# Patient Record
Sex: FEMALE | Race: WHITE | NOT HISPANIC OR LATINO | Employment: FULL TIME | URBAN - METROPOLITAN AREA
[De-identification: names, ages, dates, MRNs, and addresses within clinical notes are randomized per-mention and may not be internally consistent; named-entity substitution may affect disease eponyms.]

---

## 2020-01-14 ENCOUNTER — HOSPITAL ENCOUNTER (EMERGENCY)
Facility: HOSPITAL | Age: 55
Discharge: HOME | End: 2020-01-14
Attending: EMERGENCY MEDICINE | Admitting: EMERGENCY MEDICINE

## 2020-01-14 ENCOUNTER — APPOINTMENT (OUTPATIENT)
Dept: GENERAL RADIOLOGY | Facility: HOSPITAL | Age: 55
End: 2020-01-14

## 2020-01-14 VITALS
WEIGHT: 189 LBS | HEART RATE: 69 BPM | TEMPERATURE: 97.1 F | DIASTOLIC BLOOD PRESSURE: 82 MMHG | HEIGHT: 61 IN | BODY MASS INDEX: 35.68 KG/M2 | RESPIRATION RATE: 16 BRPM | OXYGEN SATURATION: 98 % | SYSTOLIC BLOOD PRESSURE: 152 MMHG

## 2020-01-14 DIAGNOSIS — R07.9 CHEST PAIN, UNSPECIFIED TYPE: Primary | ICD-10-CM

## 2020-01-14 LAB
ALBUMIN SERPL-MCNC: 4.2 G/DL (ref 3.5–5.2)
ALBUMIN/GLOB SERPL: 1.2 G/DL
ALP SERPL-CCNC: 149 U/L (ref 39–117)
ALT SERPL W P-5'-P-CCNC: 54 U/L (ref 1–33)
ANION GAP SERPL CALCULATED.3IONS-SCNC: 12.1 MMOL/L (ref 5–15)
AST SERPL-CCNC: 88 U/L (ref 1–32)
BASOPHILS # BLD AUTO: 0.04 10*3/MM3 (ref 0–0.2)
BASOPHILS NFR BLD AUTO: 0.8 % (ref 0–1.5)
BILIRUB SERPL-MCNC: 0.4 MG/DL (ref 0.2–1.2)
BUN BLD-MCNC: 21 MG/DL (ref 6–20)
BUN/CREAT SERPL: 22.6 (ref 7–25)
CALCIUM SPEC-SCNC: 9.9 MG/DL (ref 8.6–10.5)
CHLORIDE SERPL-SCNC: 101 MMOL/L (ref 98–107)
CO2 SERPL-SCNC: 22.9 MMOL/L (ref 22–29)
CREAT BLD-MCNC: 0.93 MG/DL (ref 0.57–1)
DEPRECATED RDW RBC AUTO: 42.6 FL (ref 37–54)
EOSINOPHIL # BLD AUTO: 0.1 10*3/MM3 (ref 0–0.4)
EOSINOPHIL NFR BLD AUTO: 1.9 % (ref 0.3–6.2)
ERYTHROCYTE [DISTWIDTH] IN BLOOD BY AUTOMATED COUNT: 12.9 % (ref 12.3–15.4)
GFR SERPL CREATININE-BSD FRML MDRD: 63 ML/MIN/1.73
GLOBULIN UR ELPH-MCNC: 3.5 GM/DL
GLUCOSE BLD-MCNC: 66 MG/DL (ref 65–99)
GLUCOSE BLDC GLUCOMTR-MCNC: 133 MG/DL (ref 70–130)
HCT VFR BLD AUTO: 41.6 % (ref 34–46.6)
HGB BLD-MCNC: 13.6 G/DL (ref 12–15.9)
IMM GRANULOCYTES # BLD AUTO: 0.01 10*3/MM3 (ref 0–0.05)
IMM GRANULOCYTES NFR BLD AUTO: 0.2 % (ref 0–0.5)
LYMPHOCYTES # BLD AUTO: 1.84 10*3/MM3 (ref 0.7–3.1)
LYMPHOCYTES NFR BLD AUTO: 34.5 % (ref 19.6–45.3)
MCH RBC QN AUTO: 29.4 PG (ref 26.6–33)
MCHC RBC AUTO-ENTMCNC: 32.7 G/DL (ref 31.5–35.7)
MCV RBC AUTO: 90 FL (ref 79–97)
MONOCYTES # BLD AUTO: 0.25 10*3/MM3 (ref 0.1–0.9)
MONOCYTES NFR BLD AUTO: 4.7 % (ref 5–12)
NEUTROPHILS # BLD AUTO: 3.09 10*3/MM3 (ref 1.7–7)
NEUTROPHILS NFR BLD AUTO: 57.9 % (ref 42.7–76)
NRBC BLD AUTO-RTO: 0 /100 WBC (ref 0–0.2)
PLATELET # BLD AUTO: 245 10*3/MM3 (ref 140–450)
PMV BLD AUTO: 9.9 FL (ref 6–12)
POTASSIUM BLD-SCNC: 3.9 MMOL/L (ref 3.5–5.2)
PROT SERPL-MCNC: 7.7 G/DL (ref 6–8.5)
RBC # BLD AUTO: 4.62 10*6/MM3 (ref 3.77–5.28)
SODIUM BLD-SCNC: 136 MMOL/L (ref 136–145)
TROPONIN T SERPL-MCNC: <0.01 NG/ML (ref 0–0.03)
WBC NRBC COR # BLD: 5.33 10*3/MM3 (ref 3.4–10.8)

## 2020-01-14 PROCEDURE — 84484 ASSAY OF TROPONIN QUANT: CPT | Performed by: PHYSICIAN ASSISTANT

## 2020-01-14 PROCEDURE — 93010 ELECTROCARDIOGRAM REPORT: CPT | Performed by: INTERNAL MEDICINE

## 2020-01-14 PROCEDURE — 36415 COLL VENOUS BLD VENIPUNCTURE: CPT

## 2020-01-14 PROCEDURE — 71045 X-RAY EXAM CHEST 1 VIEW: CPT

## 2020-01-14 PROCEDURE — 85025 COMPLETE CBC W/AUTO DIFF WBC: CPT | Performed by: PHYSICIAN ASSISTANT

## 2020-01-14 PROCEDURE — 80053 COMPREHEN METABOLIC PANEL: CPT | Performed by: EMERGENCY MEDICINE

## 2020-01-14 PROCEDURE — 93005 ELECTROCARDIOGRAM TRACING: CPT | Performed by: EMERGENCY MEDICINE

## 2020-01-14 PROCEDURE — 82962 GLUCOSE BLOOD TEST: CPT

## 2020-01-14 PROCEDURE — 99284 EMERGENCY DEPT VISIT MOD MDM: CPT

## 2020-01-14 PROCEDURE — 93005 ELECTROCARDIOGRAM TRACING: CPT

## 2020-01-14 RX ORDER — HYDROCODONE BITARTRATE AND ACETAMINOPHEN 7.5; 325 MG/1; MG/1
1 TABLET ORAL ONCE
Status: COMPLETED | OUTPATIENT
Start: 2020-01-14 | End: 2020-01-14

## 2020-01-14 RX ORDER — SODIUM CHLORIDE 0.9 % (FLUSH) 0.9 %
10 SYRINGE (ML) INJECTION AS NEEDED
Status: DISCONTINUED | OUTPATIENT
Start: 2020-01-14 | End: 2020-01-14 | Stop reason: HOSPADM

## 2020-01-14 RX ORDER — ONDANSETRON 4 MG/1
4 TABLET, ORALLY DISINTEGRATING ORAL ONCE
Status: COMPLETED | OUTPATIENT
Start: 2020-01-14 | End: 2020-01-14

## 2020-01-14 RX ADMIN — ONDANSETRON 4 MG: 4 TABLET, ORALLY DISINTEGRATING ORAL at 13:08

## 2020-01-14 RX ADMIN — HYDROCODONE BITARTRATE AND ACETAMINOPHEN 1 TABLET: 7.5; 325 TABLET ORAL at 13:08

## 2020-01-14 NOTE — ED PROVIDER NOTES
EMERGENCY DEPARTMENT ENCOUNTER    CHIEF COMPLAINT  Chief Complaint: Chest Pain   History given by: patient   History limited by: none   Room Number: 31/31  PMD: Provider, No Known      HPI:  Pt is a 54 y.o. female who presents complaining of intermittent chest pain that began at 2000 last night at rest, with episodes lasting 20-30 minutes. Pt states pain radiates to neck and bilateral arms. Pt confirms nausea and SOA upon episodes. Per pt, she has hx of stent placed in September 2019, had post operative complications, MRSA to wound site, and states she had wound vac to catheter site. Pt states she was seen again for chest pain in 12/2019, but did not have repeat cardiac cath performed.    Duration:  1 day, episodes 20- 30 minutes  Onset: gradual   Timing: intermittent   Location: chest   Radiation: to neck and bilateral arms  Quality: pain   Intensity/Severity: moderate   Progression: unchanged   Associated Symptoms: nausea and SOA   Aggravating Factors: none   Alleviating Factors: none   Previous Episodes: Pt affirms hx of CAD with stent placement.   Treatment before arrival: none     PAST MEDICAL HISTORY  Active Ambulatory Problems     Diagnosis Date Noted   • No Active Ambulatory Problems     Resolved Ambulatory Problems     Diagnosis Date Noted   • No Resolved Ambulatory Problems     No Additional Past Medical History       PAST SURGICAL HISTORY  No past surgical history on file.    FAMILY HISTORY  No family history on file.    SOCIAL HISTORY  Social History     Socioeconomic History   • Marital status: Single     Spouse name: Not on file   • Number of children: Not on file   • Years of education: Not on file   • Highest education level: Not on file       ALLERGIES  Bee venom; Flagyl [metronidazole]; Meloxicam; Metformin; and Sulfa antibiotics    REVIEW OF SYSTEMS  Review of Systems   Constitutional: Negative for fever.   HENT: Negative for sore throat.    Eyes: Negative.    Respiratory: Positive for shortness  of breath. Negative for cough.    Cardiovascular: Positive for chest pain (to neck and bilateral arms ).   Gastrointestinal: Positive for nausea. Negative for abdominal pain, diarrhea and vomiting.   Genitourinary: Negative for dysuria.   Musculoskeletal: Negative for neck pain.   Skin: Negative for rash.   Neurological: Negative for weakness, numbness and headaches.   Psychiatric/Behavioral: Negative.    All other systems reviewed and are negative.      PHYSICAL EXAM  ED Triage Vitals   Temp Heart Rate Resp BP SpO2   01/14/20 0955 01/14/20 0955 01/14/20 0955 01/14/20 1002 01/14/20 0955   97.1 °F (36.2 °C) 90 16 153/97 97 %      Temp src Heart Rate Source Patient Position BP Location FiO2 (%)   01/14/20 0955 01/14/20 0955 01/14/20 1002 01/14/20 1002 --   Tympanic Monitor Lying Right arm        Physical Exam   Constitutional: She is oriented to person, place, and time. No distress.   HENT:   Head: Normocephalic and atraumatic.   Eyes: Pupils are equal, round, and reactive to light. EOM are normal.   Neck: Normal range of motion. Neck supple.   Cardiovascular: Normal rate, regular rhythm and normal heart sounds.   Pulmonary/Chest: Effort normal and breath sounds normal. No respiratory distress.   Abdominal: Soft. There is no tenderness. There is no rebound and no guarding.   Musculoskeletal: Normal range of motion. She exhibits no edema.   Neurological: She is alert and oriented to person, place, and time. She has normal sensation and normal strength.   Skin: Skin is warm and dry. No rash noted.   Psychiatric: Mood and affect normal.   Nursing note and vitals reviewed.      LAB RESULTS  Lab Results (last 24 hours)     Procedure Component Value Units Date/Time    CBC & Differential [[id number]] Collected:  01/14/20 1023    Specimen:  Blood Updated:  01/14/20 1135    Narrative:       The following orders were created for panel order CBC & Differential.  Procedure                               Abnormality         Status                      ---------                               -----------         ------                     CBC Auto Differential[[id number]]        Abnormal            Final result                 Please view results for these tests on the individual orders.    Troponin [[id number]]  (Normal) Collected:  01/14/20 1023    Specimen:  Blood Updated:  01/14/20 1219     Troponin T <0.010 ng/mL     Narrative:       Troponin T Reference Range:  <= 0.03 ng/mL-   Negative for AMI  >0.03 ng/mL-     Abnormal for myocardial necrosis.  Clinicians would have to utilize clinical acumen, EKG, Troponin and serial changes to determine if it is an Acute Myocardial Infarction or myocardial injury due to an underlying chronic condition.       Results may be falsely decreased if patient taking Biotin.      CBC Auto Differential [[id number]]  (Abnormal) Collected:  01/14/20 1023    Specimen:  Blood Updated:  01/14/20 1135     WBC 5.33 10*3/mm3      RBC 4.62 10*6/mm3      Hemoglobin 13.6 g/dL      Hematocrit 41.6 %      MCV 90.0 fL      MCH 29.4 pg      MCHC 32.7 g/dL      RDW 12.9 %      RDW-SD 42.6 fl      MPV 9.9 fL      Platelets 245 10*3/mm3      Neutrophil % 57.9 %      Lymphocyte % 34.5 %      Monocyte % 4.7 %      Eosinophil % 1.9 %      Basophil % 0.8 %      Immature Grans % 0.2 %      Neutrophils, Absolute 3.09 10*3/mm3      Lymphocytes, Absolute 1.84 10*3/mm3      Monocytes, Absolute 0.25 10*3/mm3      Eosinophils, Absolute 0.10 10*3/mm3      Basophils, Absolute 0.04 10*3/mm3      Immature Grans, Absolute 0.01 10*3/mm3      nRBC 0.0 /100 WBC     POC Glucose Once [[id number]]  (Abnormal) Collected:  01/14/20 1129    Specimen:  Blood Updated:  01/14/20 1143     Glucose 133 mg/dL     Comprehensive Metabolic Panel [[id number]]  (Abnormal) Collected:  01/14/20 1249    Specimen:  Blood Updated:  01/14/20 1329     Glucose 66 mg/dL      BUN 21 mg/dL      Creatinine 0.93 mg/dL      Sodium 136 mmol/L      Potassium 3.9 mmol/L      Chloride 101  mmol/L      CO2 22.9 mmol/L      Calcium 9.9 mg/dL      Total Protein 7.7 g/dL      Albumin 4.20 g/dL      ALT (SGPT) 54 U/L      AST (SGOT) 88 U/L      Comment: Specimen hemolyzed.  Results may be affected.        Alkaline Phosphatase 149 U/L      Total Bilirubin 0.4 mg/dL      eGFR Non African Amer 63 mL/min/1.73      Globulin 3.5 gm/dL      A/G Ratio 1.2 g/dL      BUN/Creatinine Ratio 22.6     Anion Gap 12.1 mmol/L     Narrative:       GFR Normal >60  Chronic Kidney Disease <60  Kidney Failure <15            I ordered the above labs and reviewed the results    RADIOLOGY  Xr Chest 1 View    Result Date: 1/14/2020  Narrative: XR CHEST 1 VW-  Clinical: Shortness of breath  FINDINGS: Lungs clear. No edema or effusion. Cardiomediastinal silhouette is satisfactory in appearance.  CONCLUSION: No active disease of the chest  This report was finalized on 1/14/2020 10:44 AM by Dr. David Lweis M.D.          I ordered the above noted radiological studies. Interpreted by radiologist. Reviewed by me in PACS.       PROCEDURES  Procedures  EKG          EKG time: 1005  Rhythm/Rate: NSR 75  P waves and HI: nml   QRS, axis: nml   ST and T waves: nml     Interpreted Contemporaneously by me, independently viewed  unchanged compared to prior      PROGRESS AND CONSULTS     1010: Labs and CXR ordered for further evaluation.     1038: Upon pt exam, discussed plan for workup in ED to determine further plan for care.     1238: Discussed pt's case with Dr. Wilson (ED Physician), who, after bedside evaluation, agrees with plan for care.    1308: RN approached and states patient is complaining of pain. Norco and Zofran ordered for pain management.    1350: Pt rechecked and resting comfortably, states pain is improving following pain medication. Discussed negative acute findings of workup with plan for discharge and outpatient Cardiology follow up. Reviewed with pt her recent negative cardiac workup at [facility] and plan to not repeat that  further at this time. Pt understands and agrees with the plan, all questions answered.    MEDICAL DECISION MAKING  Results were reviewed/discussed with the patient and they were also made aware of online access. Pt also made aware that some labs, such as cultures, will not be resulted during ER visit and follow up with PMD is necessary.     MDM  Number of Diagnoses or Management Options  Chest pain, unspecified type:      Amount and/or Complexity of Data Reviewed  Clinical lab tests: ordered and reviewed (Unremarkable labs)  Tests in the radiology section of CPT®: ordered and reviewed (CXR - negative acute )  Tests in the medicine section of CPT®: ordered and reviewed (See note)  Review and summarize past medical records: yes (9/22/19 - admitted to [facility] for NSTEMI - cardiac cath performed: successful direct stenting of the proximal portion of the first obtuse  marginal artery  11/2019- negative stress test.  12/2019 - pt admitted to [facility] for acute chest pain, had normal EKG, negative CTA, and negative troponin x3. Cardiology was consulted but full cardiac workup was not repeated due to negative full workup from several months prior )           DIAGNOSIS  Final diagnoses:   Chest pain, unspecified type       DISPOSITION  DISCHARGE    Patient discharged in stable condition.    Reviewed implications of results, diagnosis, meds, responsibility to follow up, warning signs and symptoms of possible worsening, potential complications and reasons to return to ER.    Patient/Family voiced understanding of above instructions.    Discussed plan for discharge, as there is no emergent indication for admission. Patient referred to primary care provider for BP management due to today's BP. Pt/family is agreeable and understands need for follow up and repeat testing.  Pt is aware that discharge does not mean that nothing is wrong but it indicates no emergency is present that requires admission and they must continue care  with follow-up as given below or physician of their choice.     FOLLOW-UP  Kristofer Carey MD  [address]  [place] [address]  [phone]    Schedule an appointment as soon as possible for a visit            Medication List      No changes were made to your prescriptions during this visit.           Latest Documented Vital Signs:  As of 2:01 PM  BP- 144/92 HR- 67 Temp- 97.1 °F (36.2 °C) (Tympanic) O2 sat- 97%    --  Documentation assistance provided by kelly Bruce for Aly Macdonald PA-C.  Information recorded by the scribe was done at my direction and has been verified and validated by me.              Alberta Bruce  01/14/20 1401       Aly Macdonald PA  01/14/20 8712

## 2020-01-14 NOTE — ED NOTES
Pt is requesting something for her chest pain and nausea. PA notified.        Jaki Page RN  01/14/20 1129

## 2020-01-14 NOTE — ED PROVIDER NOTES
Pt is a 54 y.o. female who presents to the ED complaining of intermittent CP that started at 2200 last night. Pt also c/o nausea. Pt denies fever, chills and cough. Pt has hx of stent placement in 9/2019 with post operative complications of MRSA to wound site.     On exam,  Constitutional: NAD  HENT: normocephalic  Cardiovascular: HRRR  Pulmonary: chest wall tenderness, no crepitus or subcutaneous air  Abdomen: soft, non-tender  Musculoskeletal: moves all extremities  Neurological: A&Ox3    Labs and imaging reviewed.     Plan: Discussed w/pt there is no change from prior EKG. Plan is to obtain labs and CXR. Pt understands and agrees with the plan. All questions were answered.    MDM  Number of Diagnoses or Management Options     Amount and/or Complexity of Data Reviewed  Decide to obtain previous medical records or to obtain history from someone other than the patient: yes  Review and summarize past medical records: (Pt was admitted on 12/28/19 for CP where she had a negative CTA Chest.   Pt was admitted in 11/2019 for CP where she had a Stress test without ischemia. )          MD ATTESTATION NOTE    The CHARLENE and I have discussed this patient's history, physical exam, and treatment plan.  I have reviewed the documentation and personally had a face to face interaction with the patient. I affirm the documentation and agree with the treatment and plan.  The attached note describes my personal findings.      Documentation assistance provided by kelly Rubio for Dr. Tunde Wilson. Information recorded by the scribe was done at my direction and has been verified and validated by me.             Ramiro Boateng  01/14/20 1212       Tunde Wilson MD  01/14/20 9534

## 2020-01-25 ENCOUNTER — HOSPITAL ENCOUNTER (EMERGENCY)
Facility: HOSPITAL | Age: 55
Discharge: ANOTHER HOSPITAL | End: 2020-01-26
Attending: EMERGENCY MEDICINE | Admitting: INTERNAL MEDICINE

## 2020-01-25 ENCOUNTER — APPOINTMENT (OUTPATIENT)
Dept: GENERAL RADIOLOGY | Facility: HOSPITAL | Age: 55
End: 2020-01-25

## 2020-01-25 VITALS
DIASTOLIC BLOOD PRESSURE: 87 MMHG | TEMPERATURE: 97.7 F | WEIGHT: 183 LBS | OXYGEN SATURATION: 99 % | BODY MASS INDEX: 34.55 KG/M2 | HEART RATE: 77 BPM | RESPIRATION RATE: 16 BRPM | SYSTOLIC BLOOD PRESSURE: 134 MMHG | HEIGHT: 61 IN

## 2020-01-25 DIAGNOSIS — I20.0 UNSTABLE ANGINA (HCC): Primary | ICD-10-CM

## 2020-01-25 LAB
ALBUMIN SERPL-MCNC: 4.2 G/DL (ref 3.5–5.2)
ALBUMIN/GLOB SERPL: 1.5 G/DL
ALP SERPL-CCNC: 144 U/L (ref 39–117)
ALT SERPL W P-5'-P-CCNC: 29 U/L (ref 1–33)
ANION GAP SERPL CALCULATED.3IONS-SCNC: 15.3 MMOL/L (ref 5–15)
AST SERPL-CCNC: 20 U/L (ref 1–32)
BASOPHILS # BLD AUTO: 0.03 10*3/MM3 (ref 0–0.2)
BASOPHILS NFR BLD AUTO: 0.5 % (ref 0–1.5)
BILIRUB SERPL-MCNC: 0.3 MG/DL (ref 0.2–1.2)
BUN BLD-MCNC: 20 MG/DL (ref 6–20)
BUN/CREAT SERPL: 19.2 (ref 7–25)
CALCIUM SPEC-SCNC: 9.2 MG/DL (ref 8.6–10.5)
CHLORIDE SERPL-SCNC: 95 MMOL/L (ref 98–107)
CO2 SERPL-SCNC: 23.7 MMOL/L (ref 22–29)
CREAT BLD-MCNC: 1.04 MG/DL (ref 0.57–1)
DEPRECATED RDW RBC AUTO: 42.7 FL (ref 37–54)
EOSINOPHIL # BLD AUTO: 0.06 10*3/MM3 (ref 0–0.4)
EOSINOPHIL NFR BLD AUTO: 1 % (ref 0.3–6.2)
ERYTHROCYTE [DISTWIDTH] IN BLOOD BY AUTOMATED COUNT: 13.1 % (ref 12.3–15.4)
GFR SERPL CREATININE-BSD FRML MDRD: 55 ML/MIN/1.73
GLOBULIN UR ELPH-MCNC: 2.8 GM/DL
GLUCOSE BLD-MCNC: 256 MG/DL (ref 65–99)
HCT VFR BLD AUTO: 38.4 % (ref 34–46.6)
HGB BLD-MCNC: 12.7 G/DL (ref 12–15.9)
IMM GRANULOCYTES # BLD AUTO: 0.02 10*3/MM3 (ref 0–0.05)
IMM GRANULOCYTES NFR BLD AUTO: 0.3 % (ref 0–0.5)
LYMPHOCYTES # BLD AUTO: 1.96 10*3/MM3 (ref 0.7–3.1)
LYMPHOCYTES NFR BLD AUTO: 33.4 % (ref 19.6–45.3)
MCH RBC QN AUTO: 29.5 PG (ref 26.6–33)
MCHC RBC AUTO-ENTMCNC: 33.1 G/DL (ref 31.5–35.7)
MCV RBC AUTO: 89.1 FL (ref 79–97)
MONOCYTES # BLD AUTO: 0.41 10*3/MM3 (ref 0.1–0.9)
MONOCYTES NFR BLD AUTO: 7 % (ref 5–12)
NEUTROPHILS # BLD AUTO: 3.39 10*3/MM3 (ref 1.7–7)
NEUTROPHILS NFR BLD AUTO: 57.8 % (ref 42.7–76)
NRBC BLD AUTO-RTO: 0 /100 WBC (ref 0–0.2)
PLATELET # BLD AUTO: 265 10*3/MM3 (ref 140–450)
PMV BLD AUTO: 9.5 FL (ref 6–12)
POTASSIUM BLD-SCNC: 3.9 MMOL/L (ref 3.5–5.2)
PROT SERPL-MCNC: 7 G/DL (ref 6–8.5)
RBC # BLD AUTO: 4.31 10*6/MM3 (ref 3.77–5.28)
SODIUM BLD-SCNC: 134 MMOL/L (ref 136–145)
TROPONIN T SERPL-MCNC: <0.01 NG/ML (ref 0–0.03)
TROPONIN T SERPL-MCNC: <0.01 NG/ML (ref 0–0.03)
WBC NRBC COR # BLD: 5.87 10*3/MM3 (ref 3.4–10.8)

## 2020-01-25 PROCEDURE — 93005 ELECTROCARDIOGRAM TRACING: CPT | Performed by: EMERGENCY MEDICINE

## 2020-01-25 PROCEDURE — G0378 HOSPITAL OBSERVATION PER HR: HCPCS

## 2020-01-25 PROCEDURE — 84484 ASSAY OF TROPONIN QUANT: CPT | Performed by: EMERGENCY MEDICINE

## 2020-01-25 PROCEDURE — 96372 THER/PROPH/DIAG INJ SC/IM: CPT

## 2020-01-25 PROCEDURE — 25010000002 ONDANSETRON PER 1 MG: Performed by: EMERGENCY MEDICINE

## 2020-01-25 PROCEDURE — 25010000002 ENOXAPARIN PER 10 MG: Performed by: EMERGENCY MEDICINE

## 2020-01-25 PROCEDURE — 99285 EMERGENCY DEPT VISIT HI MDM: CPT

## 2020-01-25 PROCEDURE — 80053 COMPREHEN METABOLIC PANEL: CPT | Performed by: EMERGENCY MEDICINE

## 2020-01-25 PROCEDURE — 96375 TX/PRO/DX INJ NEW DRUG ADDON: CPT

## 2020-01-25 PROCEDURE — 36415 COLL VENOUS BLD VENIPUNCTURE: CPT

## 2020-01-25 PROCEDURE — 71046 X-RAY EXAM CHEST 2 VIEWS: CPT

## 2020-01-25 PROCEDURE — 25010000002 MORPHINE PER 10 MG: Performed by: EMERGENCY MEDICINE

## 2020-01-25 PROCEDURE — 93005 ELECTROCARDIOGRAM TRACING: CPT

## 2020-01-25 PROCEDURE — 85025 COMPLETE CBC W/AUTO DIFF WBC: CPT | Performed by: EMERGENCY MEDICINE

## 2020-01-25 PROCEDURE — 93010 ELECTROCARDIOGRAM REPORT: CPT | Performed by: INTERNAL MEDICINE

## 2020-01-25 PROCEDURE — 96374 THER/PROPH/DIAG INJ IV PUSH: CPT

## 2020-01-25 RX ORDER — NITROGLYCERIN 0.4 MG/1
0.4 TABLET SUBLINGUAL
Status: COMPLETED | OUTPATIENT
Start: 2020-01-25 | End: 2020-01-25

## 2020-01-25 RX ORDER — TOPIRAMATE 50 MG/1
50 TABLET, FILM COATED ORAL
COMMUNITY
Start: 2019-10-30 | End: 2020-10-29

## 2020-01-25 RX ORDER — NITROGLYCERIN 0.4 MG/1
0.4 TABLET SUBLINGUAL
COMMUNITY
Start: 2019-09-24

## 2020-01-25 RX ORDER — MULTIVITAMIN
1 TABLET ORAL DAILY
COMMUNITY

## 2020-01-25 RX ORDER — BUDESONIDE 0.5 MG/2ML
0.5 INHALANT ORAL
COMMUNITY
Start: 2019-10-10

## 2020-01-25 RX ORDER — MEMANTINE HYDROCHLORIDE 10 MG/1
TABLET ORAL
COMMUNITY
Start: 2019-11-14

## 2020-01-25 RX ORDER — ASPIRIN 81 MG/1
81 TABLET, CHEWABLE ORAL DAILY
COMMUNITY
Start: 2019-10-11

## 2020-01-25 RX ORDER — ISOSORBIDE MONONITRATE 30 MG/1
30 TABLET, EXTENDED RELEASE ORAL DAILY
COMMUNITY
Start: 2019-12-31

## 2020-01-25 RX ORDER — FERROUS SULFATE 325(65) MG
325 TABLET ORAL DAILY
COMMUNITY
Start: 2019-07-09

## 2020-01-25 RX ORDER — MONTELUKAST SODIUM 4 MG/1
1 TABLET, CHEWABLE ORAL
COMMUNITY
Start: 2019-12-16

## 2020-01-25 RX ORDER — FLUDROCORTISONE ACETATE 0.1 MG/1
0.2 TABLET ORAL DAILY
COMMUNITY
Start: 2019-09-07

## 2020-01-25 RX ORDER — ROPINIROLE 1 MG/1
1 TABLET, FILM COATED ORAL DAILY
COMMUNITY

## 2020-01-25 RX ORDER — ALBUTEROL SULFATE 2.5 MG/3ML
2.5 SOLUTION RESPIRATORY (INHALATION)
COMMUNITY
Start: 2019-05-14

## 2020-01-25 RX ORDER — MORPHINE SULFATE 2 MG/ML
2 INJECTION, SOLUTION INTRAMUSCULAR; INTRAVENOUS ONCE
Status: COMPLETED | OUTPATIENT
Start: 2020-01-25 | End: 2020-01-25

## 2020-01-25 RX ORDER — POTASSIUM CHLORIDE 20 MEQ/1
40 TABLET, EXTENDED RELEASE ORAL DAILY
COMMUNITY
Start: 2019-10-16

## 2020-01-25 RX ORDER — SUMATRIPTAN 6 MG/.5ML
6 INJECTION, SOLUTION SUBCUTANEOUS ONCE
COMMUNITY

## 2020-01-25 RX ORDER — PANTOPRAZOLE SODIUM 40 MG/1
TABLET, DELAYED RELEASE ORAL
COMMUNITY
Start: 2020-01-16

## 2020-01-25 RX ORDER — LOSARTAN POTASSIUM 25 MG/1
25 TABLET ORAL DAILY
COMMUNITY
Start: 2019-11-14

## 2020-01-25 RX ORDER — ASCORBIC ACID 500 MG
500 TABLET ORAL DAILY
COMMUNITY
Start: 2019-10-11

## 2020-01-25 RX ORDER — GABAPENTIN 400 MG/1
CAPSULE ORAL
COMMUNITY
Start: 2019-11-14

## 2020-01-25 RX ORDER — ONDANSETRON 2 MG/ML
4 INJECTION INTRAMUSCULAR; INTRAVENOUS ONCE
Status: COMPLETED | OUTPATIENT
Start: 2020-01-25 | End: 2020-01-25

## 2020-01-25 RX ORDER — MULTIVITAMIN,THER AND MINERALS
1 TABLET ORAL DAILY
COMMUNITY
Start: 2019-10-11

## 2020-01-25 RX ORDER — ASPIRIN 81 MG/1
243 TABLET, CHEWABLE ORAL ONCE
Status: COMPLETED | OUTPATIENT
Start: 2020-01-25 | End: 2020-01-25

## 2020-01-25 RX ORDER — MIDODRINE HYDROCHLORIDE 2.5 MG/1
2.5 TABLET ORAL 3 TIMES DAILY
COMMUNITY
Start: 2019-10-30

## 2020-01-25 RX ORDER — SUMATRIPTAN 25 MG/1
25 TABLET, FILM COATED ORAL
COMMUNITY

## 2020-01-25 RX ORDER — PREGABALIN 75 MG/1
CAPSULE ORAL
COMMUNITY
Start: 2019-10-10

## 2020-01-25 RX ORDER — FOLIC ACID 1 MG/1
1 TABLET ORAL DAILY
COMMUNITY
Start: 2019-12-16

## 2020-01-25 RX ORDER — LEVOTHYROXINE SODIUM 0.05 MG/1
50 TABLET ORAL DAILY
COMMUNITY
Start: 2019-06-11

## 2020-01-25 RX ORDER — DULOXETIN HYDROCHLORIDE 60 MG/1
60 CAPSULE, DELAYED RELEASE ORAL DAILY
COMMUNITY
Start: 2019-05-14

## 2020-01-25 RX ORDER — DIPHENOXYLATE HYDROCHLORIDE AND ATROPINE SULFATE 2.5; .025 MG/1; MG/1
1-2 TABLET ORAL
COMMUNITY
Start: 2019-05-14

## 2020-01-25 RX ORDER — SODIUM CHLORIDE 0.9 % (FLUSH) 0.9 %
10 SYRINGE (ML) INJECTION AS NEEDED
Status: DISCONTINUED | OUTPATIENT
Start: 2020-01-25 | End: 2020-01-26 | Stop reason: HOSPADM

## 2020-01-25 RX ORDER — DICYCLOMINE HCL 20 MG
20 TABLET ORAL
COMMUNITY
Start: 2019-11-14

## 2020-01-25 RX ORDER — HYDROCODONE BITARTRATE AND ACETAMINOPHEN 10; 325 MG/1; MG/1
1 TABLET ORAL
COMMUNITY
Start: 2020-01-02

## 2020-01-25 RX ORDER — ATORVASTATIN CALCIUM 20 MG/1
40 TABLET, FILM COATED ORAL DAILY
COMMUNITY
Start: 2019-09-24 | End: 2020-09-23

## 2020-01-25 RX ADMIN — MORPHINE SULFATE 2 MG: 2 INJECTION, SOLUTION INTRAMUSCULAR; INTRAVENOUS at 19:58

## 2020-01-25 RX ADMIN — ENOXAPARIN SODIUM 80 MG: 80 INJECTION SUBCUTANEOUS at 21:16

## 2020-01-25 RX ADMIN — ONDANSETRON 4 MG: 2 INJECTION INTRAMUSCULAR; INTRAVENOUS at 19:58

## 2020-01-25 RX ADMIN — NITROGLYCERIN 0.4 MG: 0.4 TABLET SUBLINGUAL at 19:56

## 2020-01-25 RX ADMIN — ASPIRIN 243 MG: 81 TABLET, CHEWABLE ORAL at 21:18

## 2020-01-25 NOTE — ED PROVIDER NOTES
EMERGENCY DEPARTMENT ENCOUNTER    Room Number:  16/16  Date seen:  1/25/2020  Time seen: 6:03 PM  PCP: Roshan Francis MD  Historian: Pt      HPI:  Chief Complaint: chest pain  A complete HPI/ROS/PMH/PSH/SH/FH are unobtainable due to: none  Context: Karen Bradley is a 54 y.o. female with cardiac stints who presents to the ED c/o gradual onset, intermittent chest pain since September 2019 worsening over the last 4 days. She also c/o nausea and soa with chest pain. Pt has temporary relief of pain with nitroglycerin.  She has had to take her nitroglycerin much more frequently.  Pt is also on baby ASA, Brilinta and Imdur (x1 30mg a day). She reports recent BP measurements 180/90's.        PAST MEDICAL HISTORY  Active Ambulatory Problems     Diagnosis Date Noted   • No Active Ambulatory Problems     Resolved Ambulatory Problems     Diagnosis Date Noted   • No Resolved Ambulatory Problems     Past Medical History:   Diagnosis Date   • Anemia    • Arthritis    • Asthma    • COPD (chronic obstructive pulmonary disease) (CMS/[facility])    • Coronary artery disease    • Depression    • Diabetes mellitus (CMS/HCC)    • Disease of thyroid gland    • GERD (gastroesophageal reflux disease)    • History of transfusion    • Hyperlipidemia    • Hypertension    • Injury of back    • Kidney stone    • Migraine    • Myocardial infarction (CMS/HCC)    • Pneumonia          PAST SURGICAL HISTORY  Past Surgical History:   Procedure Laterality Date   • CHOLECYSTECTOMY     • CORONARY ANGIOPLASTY WITH STENT PLACEMENT     • OOPHORECTOMY Right    • VASCULAR SURGERY           FAMILY HISTORY  History reviewed. No pertinent family history.      SOCIAL HISTORY  Social History     Socioeconomic History   • Marital status: Single     Spouse name: Not on file   • Number of children: Not on file   • Years of education: Not on file   • Highest education level: Not on file   Tobacco Use   • Smoking status: Never Smoker   • Smokeless tobacco: Never Used    Substance and Sexual Activity   • Alcohol use: Yes     Comment: occasional glass of wine   • Drug use: Never   • Sexual activity: Defer         ALLERGIES  Bee venom; Flagyl [metronidazole]; Meloxicam; Metformin; and Sulfa antibiotics        REVIEW OF SYSTEMS  Review of Systems   Constitutional: Negative for diaphoresis and fever.   HENT: Negative for congestion.    Eyes: Negative for visual disturbance.   Respiratory: Positive for shortness of breath.    Cardiovascular: Positive for chest pain. Negative for palpitations.   Gastrointestinal: Positive for nausea. Negative for blood in stool and vomiting.   Endocrine: Negative for polyuria.   Genitourinary: Negative for flank pain.   Musculoskeletal: Negative for joint swelling.   Skin: Negative for wound.   Neurological: Negative for seizures.   Hematological: Negative for adenopathy.   Psychiatric/Behavioral: Negative for sleep disturbance.        PHYSICAL EXAM  ED Triage Vitals [01/25/20 1751]   Temp Heart Rate Resp BP SpO2   97.7 °F (36.5 °C) 88 16 -- 98 %      Temp src Heart Rate Source Patient Position BP Location FiO2 (%)   Tympanic Monitor -- -- --         GENERAL: awake and alert, no acute distress  HENT: nares patent  EYES: no scleral icterus  CV: regular rhythm, normal rate, no murmur  RESPIRATORY: normal effort, CTAB  ABDOMEN: soft, non tender  MUSCULOSKELETAL: no deformity, no BLE edema or calf tenderness  NEURO: alert, moves all extremities, follows commands  SKIN: warm, dry    Vital signs and nursing notes reviewed.          LAB RESULTS  Recent Results (from the past 24 hour(s))   Comprehensive Metabolic Panel    Collection Time: 01/25/20  6:33 PM   Result Value Ref Range    Glucose 256 (H) 65 - 99 mg/dL    BUN 20 6 - 20 mg/dL    Creatinine 1.04 (H) 0.57 - 1.00 mg/dL    Sodium 134 (L) 136 - 145 mmol/L    Potassium 3.9 3.5 - 5.2 mmol/L    Chloride 95 (L) 98 - 107 mmol/L    CO2 23.7 22.0 - 29.0 mmol/L    Calcium 9.2 8.6 - 10.5 mg/dL    Total Protein 7.0  6.0 - 8.5 g/dL    Albumin 4.20 3.50 - 5.20 g/dL    ALT (SGPT) 29 1 - 33 U/L    AST (SGOT) 20 1 - 32 U/L    Alkaline Phosphatase 144 (H) 39 - 117 U/L    Total Bilirubin 0.3 0.2 - 1.2 mg/dL    eGFR Non African Amer 55 (L) >60 mL/min/1.73    Globulin 2.8 gm/dL    A/G Ratio 1.5 g/dL    BUN/Creatinine Ratio 19.2 7.0 - 25.0    Anion Gap 15.3 (H) 5.0 - 15.0 mmol/L   Troponin    Collection Time: 01/25/20  6:33 PM   Result Value Ref Range    Troponin T <0.010 0.000 - 0.030 ng/mL   CBC Auto Differential    Collection Time: 01/25/20  6:33 PM   Result Value Ref Range    WBC 5.87 3.40 - 10.80 10*3/mm3    RBC 4.31 3.77 - 5.28 10*6/mm3    Hemoglobin 12.7 12.0 - 15.9 g/dL    Hematocrit 38.4 34.0 - 46.6 %    MCV 89.1 79.0 - 97.0 fL    MCH 29.5 26.6 - 33.0 pg    MCHC 33.1 31.5 - 35.7 g/dL    RDW 13.1 12.3 - 15.4 %    RDW-SD 42.7 37.0 - 54.0 fl    MPV 9.5 6.0 - 12.0 fL    Platelets 265 140 - 450 10*3/mm3    Neutrophil % 57.8 42.7 - 76.0 %    Lymphocyte % 33.4 19.6 - 45.3 %    Monocyte % 7.0 5.0 - 12.0 %    Eosinophil % 1.0 0.3 - 6.2 %    Basophil % 0.5 0.0 - 1.5 %    Immature Grans % 0.3 0.0 - 0.5 %    Neutrophils, Absolute 3.39 1.70 - 7.00 10*3/mm3    Lymphocytes, Absolute 1.96 0.70 - 3.10 10*3/mm3    Monocytes, Absolute 0.41 0.10 - 0.90 10*3/mm3    Eosinophils, Absolute 0.06 0.00 - 0.40 10*3/mm3    Basophils, Absolute 0.03 0.00 - 0.20 10*3/mm3    Immature Grans, Absolute 0.02 0.00 - 0.05 10*3/mm3    nRBC 0.0 0.0 - 0.2 /100 WBC   Troponin    Collection Time: 01/25/20  8:01 PM   Result Value Ref Range    Troponin T <0.010 0.000 - 0.030 ng/mL       Ordered the above labs and reviewed the results.        RADIOLOGY  Xr Chest 2 View    Result Date: 1/25/2020  PA AND LATERAL CHEST  HISTORY: Chest pain. Asthma. COPD. Hypertension.  COMPARISON: AP chest 01/14/2020.  FINDINGS: Cardiomediastinal silhouette is within normal limits. There is mild linear subsegmental atelectasis in the left lower lobe near the left lateral costophrenic angle.  Lungs are otherwise clear and there is no evidence for pulmonary edema or pleural effusion. Cardiac monitor and leads are noted.      Mild subsegmental atelectasis left base. Otherwise, negative exam.  This report was finalized on 1/25/2020 7:46 PM by Dr. Hernesto Duke M.D.        Ordered the above noted radiological studies. Reviewed by me in PACS.      PROCEDURES  Procedures        EKG:           EKG time: 1757  Rhythm/Rate: SR 75  P waves and MO: Nml  QRS, axis: Nml axis, low voltage   ST and T waves: No acute ischemic changes     Interpreted Contemporaneously by me, independently viewed  Similar compared to prior 1/14/20        MEDICATIONS GIVEN IN ER  Medications   sodium chloride 0.9 % flush 10 mL (has no administration in time range)   aspirin chewable tablet 243 mg (has no administration in time range)   enoxaparin (LOVENOX) syringe 80 mg (has no administration in time range)   nitroglycerin (NITROSTAT) SL tablet 0.4 mg (0.4 mg Sublingual Given 1/25/20 1956)   morphine injection 2 mg (2 mg Intravenous Given 1/25/20 1958)   ondansetron (ZOFRAN) injection 4 mg (4 mg Intravenous Given 1/25/20 1958)               MEDICAL DECISION MAKING and ED Course    ED Course as of Jan 25 2045   Sat Jan 25, 2020   1806 Records reviewed from [facility] 9/2019:    Conclusions:    Equivocal iFR measurement across the proximal second obtuse marginal artery.  Interval development of thrombus and possibly dissection at the area of the  original lesion.  Successful direct stenting of the proximal portion of the first obtuse  marginal artery.  Interventional Recommendation  Dual antiplatelet therapy for one year then aspirin indefinitely.  Cardiac risk factor modification.  Cardiac rehabilitation.    [JR]   1934 HEART score 5.     [JR]   2042 Dr. Mascorro, cardiologist at [facility], accepts transfer.     [JR]      ED Course User Index  [JR] Octavio Adair MD     1953: Phone call with Dr. Blackburn, cardiology,  who agrees to admit the patient for further evaluation of her chest pain.    2040: Patient informed that her insurance is nonparticipating here and therefore we can happily admit her here however she will incur additional out-of-pocket expenses.  Pt wishes to be transferred to [facility] cardiology unit due to insurance issues at [facility], and for continuity of care with her cardiologist.      MDM     54-year-old female with history of CAD status post PCI to  with ROSITA and 9/20/2019, history of stable angina, presents today with worsening angina with increased need for nitroglycerin which is now only partially alleviating her pain.  She also takes Imdur 30 mg daily.  She reports her blood pressure has been high at home however it is normal here and she still having discomfort.  Chest x-ray is negative acute today.  Initial cardiac troponin is negative.  EKG is without acute ischemic changes.  She was given a single nitroglycerin here, additional 243 mg of aspirin, and 1 mg/kg of Lovenox subcutaneously for unstable angina.  She will require admission for further evaluation.  Due to insurance being nonparticipant or here, patient elects to be transferred to [facility] where she has been accepted by the cardiology service.    DIAGNOSIS  Final diagnoses:   Unstable angina (CMS/[facility])         DISPOSITION  Pt transferred to [facility].      Latest Documented Vital Signs:  As of 8:45 PM  BP- 144/88 HR- 78 Temp- 97.7 °F (36.5 °C) (Tympanic) O2 sat- 96%        --  Documentation assistance provided by kelly Tamayo for Dr. ALEXA Adair MD.  Information recorded by the scribe was done at my direction and has been verified and validated by me.      Please note that portions of this were completed with a voice recognition program.          Lucrecia Tamayo  01/25/20 2045       Octavio Adair MD  01/25/20 2049

## 2020-01-25 NOTE — ED TRIAGE NOTES
"Intermittent cp x 3-4 days.  Has been taken nitro and it 'calms down\" but doesn't do away.  She has take 2 nitro today.  Is nauseated and has back pain  "

## 2020-01-26 NOTE — ED NOTES
Pt access called at [facility] for update on room assignment. No new information obtained. Still awaiting bed assignment.     Vale Roy RN  01/25/20 1171

## 2020-01-26 NOTE — ED NOTES
/  Nursing report ED to floor  Karen Bradley  54 y.o.  female    HPI (triage note):   Chief Complaint   Patient presents with   • Chest Pain       Admitting doctor:   Rashi Blackburn DO    Admitting diagnosis:   The encounter diagnosis was Unstable angina (CMS/HCC).    Code status:   Current Code Status     Date Active Code Status Order ID Comments User Context       Not on file          Allergies:   Bee venom; Flagyl [metronidazole]; Meloxicam; Metformin; and Sulfa antibiotics    Weight:       01/25/20 1834   Weight: 83 kg (183 lb)       Most recent vitals:   Vitals:    01/25/20 1827 01/25/20 1834 01/25/20 1956 01/25/20 2000   BP: 126/81  144/88 144/88   BP Location: Left arm   Right arm   Patient Position: Lying   Lying   Pulse:   73 78   Resp:    18   Temp:       TempSrc:       SpO2:    96%   Weight:  83 kg (183 lb)     Height:           Active LDAs/IV Access:   Lines, Drains & Airways    Active LDAs     Name:   Placement date:   Placement time:   Site:   Days:    Peripheral IV 01/25/20 1836 Left Antecubital   01/25/20 1836    Antecubital   less than 1                Labs (abnormal labs have a star):   Labs Reviewed   COMPREHENSIVE METABOLIC PANEL - Abnormal; Notable for the following components:       Result Value    Glucose 256 (*)     Creatinine 1.04 (*)     Sodium 134 (*)     Chloride 95 (*)     Alkaline Phosphatase 144 (*)     eGFR Non  Amer 55 (*)     Anion Gap 15.3 (*)     All other components within normal limits    Narrative:     GFR Normal >60  Chronic Kidney Disease <60  Kidney Failure <15     TROPONIN (IN-HOUSE) - Normal    Narrative:     Troponin T Reference Range:  <= 0.03 ng/mL-   Negative for AMI  >0.03 ng/mL-     Abnormal for myocardial necrosis.  Clinicians would have to utilize clinical acumen, EKG, Troponin and serial changes to determine if it is an Acute Myocardial Infarction or myocardial injury due to an underlying chronic condition.       Results may be falsely decreased  if patient taking Biotin.     CBC WITH AUTO DIFFERENTIAL - Normal   TROPONIN (IN-HOUSE)   CBC AND DIFFERENTIAL    Narrative:     The following orders were created for panel order CBC & Differential.  Procedure                               Abnormality         Status                     ---------                               -----------         ------                     CBC Auto Differential[[id number]]        Normal              Final result                 Please view results for these tests on the individual orders.       EKG:   ECG 12 Lead   Final Result   HEART RATE= 75  bpm   RR Interval= 800  ms   NH Interval= 161  ms   P Horizontal Axis= -62  deg   P Front Axis= 45  deg   QRSD Interval= 89  ms   QT Interval= 368  ms   QRS Axis= 23  deg   T Wave Axis= 86  deg   - ABNORMAL ECG -   Sinus rhythm   Low voltage, extremity leads   Nonspecific T abnormalities, lateral leads   When compared with ECG of 14-Jan-2020 10:05:19,   No change from prior tracing   Electronically Signed By: Binu BarnardMARISOL) ([facility]) 25-Jan-2020 19:43:44   Date and Time of Study: 2020-01-25 17:57:29          Meds given in ED:   Medications   sodium chloride 0.9 % flush 10 mL (has no administration in time range)   nitroglycerin (NITROSTAT) SL tablet 0.4 mg (0.4 mg Sublingual Given 1/25/20 1956)   morphine injection 2 mg (2 mg Intravenous Given 1/25/20 1958)   ondansetron (ZOFRAN) injection 4 mg (4 mg Intravenous Given 1/25/20 1958)       Imaging results:  Xr Chest 2 View    Result Date: 1/25/2020  Mild subsegmental atelectasis left base. Otherwise, negative exam.  This report was finalized on 1/25/2020 7:46 PM by Dr. Hernesto Duek M.D.        Ambulatory status:   - up ad dom    Social issues:   Social History     Socioeconomic History   • Marital status: Single     Spouse name: Not on file   • Number of children: Not on file   • Years of education: Not on file   • Highest education level: Not on file        Vale Roy, RN  01/25/20  2012     sudden onset

## 2020-01-26 NOTE — ED NOTES
Room assignment received from [facility]. Nurse is tied up with raphael pt and unable to take report at this time, but will call back when she is able.     Vale Roy, RN  01/25/20 9049

## 2020-01-27 ENCOUNTER — ON CAMPUS - OUTPATIENT (OUTPATIENT)
Dept: URBAN - METROPOLITAN AREA HOSPITAL 108 | Facility: HOSPITAL | Age: 55
End: 2020-01-27

## 2020-01-27 DIAGNOSIS — K21.0 GASTRO-ESOPHAGEAL REFLUX DISEASE WITH ESOPHAGITIS: ICD-10-CM

## 2020-01-27 DIAGNOSIS — R07.89 OTHER CHEST PAIN: ICD-10-CM

## 2020-01-27 DIAGNOSIS — Z21 ASYMPTOMATIC HUMAN IMMUNODEFICIENCY VIRUS [HIV] INFECTION ST: ICD-10-CM

## 2020-01-27 PROCEDURE — 99203 OFFICE O/P NEW LOW 30 MIN: CPT | Performed by: INTERNAL MEDICINE

## 2020-05-27 VITALS — WEIGHT: 174 LBS | HEIGHT: 61 IN

## 2020-05-28 ENCOUNTER — OFFICE (OUTPATIENT)
Dept: URBAN - METROPOLITAN AREA CLINIC 75 | Facility: CLINIC | Age: 55
End: 2020-05-28

## 2020-05-28 DIAGNOSIS — R10.11 RIGHT UPPER QUADRANT PAIN: ICD-10-CM

## 2020-05-28 DIAGNOSIS — R11.2 NAUSEA WITH VOMITING, UNSPECIFIED: ICD-10-CM

## 2020-05-28 DIAGNOSIS — R93.3 ABNORMAL FINDINGS ON DIAGNOSTIC IMAGING OF OTHER PARTS OF DI: ICD-10-CM

## 2020-05-28 PROCEDURE — 99214 OFFICE O/P EST MOD 30 MIN: CPT | Mod: 95 | Performed by: INTERNAL MEDICINE

## 2020-05-28 NOTE — SERVICENOTES
Unable to do telemedicine visit due to patient's phone not working. Audio only visit done instead. Therefore, unable to do physical exam. 2nd call lasted 6 minutes and 50 seconds.

## 2020-05-28 NOTE — SERVICEHPINOTES
Patient is a 53 yo F with h/o anemia, anxiety, asthma, COPD, depression, DM, fibromyalgia, GERD, HIV, HLD, HTN, IBS, migraine, lupus, GILLIAN, CAD here due to abdominal pain. She was seen at [facility] as an inpatient 1/2020 for chest pain. This has improved. She was seen by [facility] 6/19 as an inpatient due to right rib pain that was attributed to MSK cause.She has been having abdominal pain, nausea, vomiting. The pain is on her right side. She says this started on Mother's day. She has had two ER visits for this. It is always present. It is a stabbing pain that is intense. Eating makes it worse. She is taking carafate, protonix and Zofran which is not helping. She says she also tried reglan, which is not helping. She has been losing weight for the past 4 years. She says this is due to her lupus and HIV, but says nobody is working up her weight loss. She says she has lost 185lbs. She says her HIV is under good control.  Weight 1/2020 was 189lbs.LUISITO Gardiner had cardiac cath 9/2019, which was complicated by post op pseudoaneurysm that got infected with MRSA requiring vascular surgery. She had cardiac cath 3/2020 with MARIA L placement. Stress test done 4/2020 was low risk. She takes aspirin and brilinta. LUISITO Stevenson brother had cirrhosis 2/2 alcohol use.Per chart reviewBR8/13/2013 EGD/Colon - mild gastritis. poor prep. small int hemorrhoidsBRPathology - duod- focal active inflammation, chronic gastritis, HP neg. AC- focal active colitis. TV/DC- focal congestionBR4/19/2019 - GES - normal though emptying half life ULNBR5/2019 EGD- gastritisBRPath- focal chronic active gastritis with surface erosion, no IM or HPBR6/24/2019 - US ABD RUQ - s/p lorena, no other abn detectedBR6/24/19 MRCP- 1. No significant biliary duct dilation along for cholecystectomy. No filling defects in the biliary system. Normal pancreatic duct.BR2. Minor bilateral renal scarring.BR9/23/2019 cardiac cath with MARIA L placement BR3/18/2020 cardiac cath with MARIA L placementBR4/8/2020 stress test- low risk BR5/19/2020 - CT ABD PELVIS with - mild linear atelectasis. fatty liver. s/p lorena. CBD mildly prominent, but stable. normal pancreas, spleen. mild thickening/edema within antrum of stomach and first part of duodenum. mod stool BR5/19/2020 - ER visit - c/o RUQ pain, nausea. seen for gastritis, duodenitis, acute UTI. given Carafate, Zofran, macrobid BR5/20/2020 - OV - Katharina - c/o epigastric pain, nausea diarrhea. given Lomotil. added Elavil for sleep, neuropathy BR5/26/2020 - lab - WBC 6.42, hgb 14.6, plat 254, creat 1, TB 0.6, AST 33, ALT 39H, AP 133H, lipase 232 5/26/2020 - ER - seen for RUQ pain. seen in ER 5/19/20 for same symptoms and given Carafate with no improvement. given reglan, protonix, pepcid

## 2020-06-22 ENCOUNTER — ON CAMPUS - OUTPATIENT (OUTPATIENT)
Dept: URBAN - METROPOLITAN AREA HOSPITAL 108 | Facility: HOSPITAL | Age: 55
End: 2020-06-22

## 2020-06-22 DIAGNOSIS — R11.2 NAUSEA WITH VOMITING, UNSPECIFIED: ICD-10-CM

## 2020-06-22 DIAGNOSIS — R10.11 RIGHT UPPER QUADRANT PAIN: ICD-10-CM

## 2020-06-22 DIAGNOSIS — K25.9 GASTRIC ULCER, UNSPECIFIED AS ACUTE OR CHRONIC, WITHOUT HEMO: ICD-10-CM

## 2020-06-22 DIAGNOSIS — R93.3 ABNORMAL FINDINGS ON DIAGNOSTIC IMAGING OF OTHER PARTS OF DI: ICD-10-CM

## 2020-06-22 DIAGNOSIS — K29.70 GASTRITIS, UNSPECIFIED, WITHOUT BLEEDING: ICD-10-CM

## 2020-06-22 PROCEDURE — 43235 EGD DIAGNOSTIC BRUSH WASH: CPT | Performed by: INTERNAL MEDICINE

## 2021-03-07 VITALS
TEMPERATURE: 97.2 F | SYSTOLIC BLOOD PRESSURE: 174 MMHG | WEIGHT: 177 LBS | HEIGHT: 61 IN | DIASTOLIC BLOOD PRESSURE: 110 MMHG

## 2021-03-09 ENCOUNTER — OFFICE (OUTPATIENT)
Dept: URBAN - METROPOLITAN AREA CLINIC 75 | Facility: CLINIC | Age: 56
End: 2021-03-09

## 2021-03-09 DIAGNOSIS — R11.2 NAUSEA WITH VOMITING, UNSPECIFIED: ICD-10-CM

## 2021-03-09 DIAGNOSIS — Z12.11 ENCOUNTER FOR SCREENING FOR MALIGNANT NEOPLASM OF COLON: ICD-10-CM

## 2021-03-09 DIAGNOSIS — R74.8 ABNORMAL LEVELS OF OTHER SERUM ENZYMES: ICD-10-CM

## 2021-03-09 PROCEDURE — 99214 OFFICE O/P EST MOD 30 MIN: CPT | Performed by: INTERNAL MEDICINE

## 2021-04-26 ENCOUNTER — ON CAMPUS - OUTPATIENT (OUTPATIENT)
Dept: URBAN - METROPOLITAN AREA HOSPITAL 108 | Facility: HOSPITAL | Age: 56
End: 2021-04-26
Payer: MEDICARE

## 2021-04-26 DIAGNOSIS — K63.5 POLYP OF COLON: ICD-10-CM

## 2021-04-26 DIAGNOSIS — R63.4 ABNORMAL WEIGHT LOSS: ICD-10-CM

## 2021-04-26 DIAGNOSIS — K29.70 GASTRITIS, UNSPECIFIED, WITHOUT BLEEDING: ICD-10-CM

## 2021-04-26 DIAGNOSIS — R11.2 NAUSEA WITH VOMITING, UNSPECIFIED: ICD-10-CM

## 2021-04-26 PROCEDURE — 43239 EGD BIOPSY SINGLE/MULTIPLE: CPT | Performed by: INTERNAL MEDICINE

## 2021-04-26 PROCEDURE — 45380 COLONOSCOPY AND BIOPSY: CPT | Performed by: INTERNAL MEDICINE

## 2021-09-26 ENCOUNTER — INPATIENT HOSPITAL (OUTPATIENT)
Dept: URBAN - METROPOLITAN AREA HOSPITAL 107 | Facility: HOSPITAL | Age: 56
End: 2021-09-26
Payer: MEDICARE

## 2021-09-26 DIAGNOSIS — R10.11 RIGHT UPPER QUADRANT PAIN: ICD-10-CM

## 2021-09-26 DIAGNOSIS — S36.112A CONTUSION OF LIVER, INITIAL ENCOUNTER: ICD-10-CM

## 2021-09-26 DIAGNOSIS — K91.840 POSTPROCEDURAL HEMORRHAGE OF A DIGESTIVE SYSTEM ORGAN OR STR: ICD-10-CM

## 2021-09-26 DIAGNOSIS — R74.8 ABNORMAL LEVELS OF OTHER SERUM ENZYMES: ICD-10-CM

## 2021-09-26 PROCEDURE — 99222 1ST HOSP IP/OBS MODERATE 55: CPT | Performed by: INTERNAL MEDICINE

## 2021-09-27 ENCOUNTER — INPATIENT HOSPITAL (OUTPATIENT)
Dept: URBAN - METROPOLITAN AREA HOSPITAL 107 | Facility: HOSPITAL | Age: 56
End: 2021-09-27
Payer: MEDICARE

## 2021-09-27 DIAGNOSIS — R10.11 RIGHT UPPER QUADRANT PAIN: ICD-10-CM

## 2021-09-27 DIAGNOSIS — K76.89 OTHER SPECIFIED DISEASES OF LIVER: ICD-10-CM

## 2021-09-27 PROCEDURE — 99231 SBSQ HOSP IP/OBS SF/LOW 25: CPT | Performed by: PHYSICIAN ASSISTANT

## 2022-12-21 ENCOUNTER — HOSPITAL ENCOUNTER (EMERGENCY)
Facility: HOSPITAL | Age: 57
Discharge: HOME | End: 2022-12-21
Attending: EMERGENCY MEDICINE | Admitting: EMERGENCY MEDICINE

## 2022-12-21 VITALS
TEMPERATURE: 96.6 F | DIASTOLIC BLOOD PRESSURE: 86 MMHG | RESPIRATION RATE: 18 BRPM | OXYGEN SATURATION: 100 % | BODY MASS INDEX: 33.86 KG/M2 | SYSTOLIC BLOOD PRESSURE: 131 MMHG | HEIGHT: 62 IN | HEART RATE: 67 BPM | WEIGHT: 184 LBS

## 2022-12-21 DIAGNOSIS — R10.10 ACUTE UPPER ABDOMINAL PAIN: ICD-10-CM

## 2022-12-21 DIAGNOSIS — K31.84 GASTROPARESIS: ICD-10-CM

## 2022-12-21 DIAGNOSIS — R42 ORTHOSTATIC DIZZINESS: ICD-10-CM

## 2022-12-21 DIAGNOSIS — R11.2 NAUSEA VOMITING AND DIARRHEA: Primary | ICD-10-CM

## 2022-12-21 DIAGNOSIS — R19.7 NAUSEA VOMITING AND DIARRHEA: Primary | ICD-10-CM

## 2022-12-21 LAB
ALBUMIN SERPL-MCNC: 4.3 G/DL (ref 3.5–5.2)
ALBUMIN/GLOB SERPL: 1.4 G/DL
ALP SERPL-CCNC: 155 U/L (ref 39–117)
ALT SERPL W P-5'-P-CCNC: 11 U/L (ref 1–33)
ANION GAP SERPL CALCULATED.3IONS-SCNC: 7.9 MMOL/L (ref 5–15)
AST SERPL-CCNC: 13 U/L (ref 1–32)
BASOPHILS # BLD AUTO: 0.03 10*3/MM3 (ref 0–0.2)
BASOPHILS NFR BLD AUTO: 0.6 % (ref 0–1.5)
BILIRUB SERPL-MCNC: 0.4 MG/DL (ref 0–1.2)
BUN SERPL-MCNC: 27 MG/DL (ref 6–20)
BUN/CREAT SERPL: 23.7 (ref 7–25)
CALCIUM SPEC-SCNC: 9.3 MG/DL (ref 8.6–10.5)
CHLORIDE SERPL-SCNC: 99 MMOL/L (ref 98–107)
CO2 SERPL-SCNC: 26.1 MMOL/L (ref 22–29)
CREAT SERPL-MCNC: 1.14 MG/DL (ref 0.57–1)
DEPRECATED RDW RBC AUTO: 40.7 FL (ref 37–54)
EGFRCR SERPLBLD CKD-EPI 2021: 55.9 ML/MIN/1.73
EOSINOPHIL # BLD AUTO: 0.09 10*3/MM3 (ref 0–0.4)
EOSINOPHIL NFR BLD AUTO: 1.9 % (ref 0.3–6.2)
ERYTHROCYTE [DISTWIDTH] IN BLOOD BY AUTOMATED COUNT: 12.6 % (ref 12.3–15.4)
GLOBULIN UR ELPH-MCNC: 3 GM/DL
GLUCOSE SERPL-MCNC: 331 MG/DL (ref 65–99)
HCT VFR BLD AUTO: 36.4 % (ref 34–46.6)
HGB BLD-MCNC: 12.3 G/DL (ref 12–15.9)
IMM GRANULOCYTES # BLD AUTO: 0.01 10*3/MM3 (ref 0–0.05)
IMM GRANULOCYTES NFR BLD AUTO: 0.2 % (ref 0–0.5)
LIPASE SERPL-CCNC: 71 U/L (ref 13–60)
LYMPHOCYTES # BLD AUTO: 1.37 10*3/MM3 (ref 0.7–3.1)
LYMPHOCYTES NFR BLD AUTO: 28.2 % (ref 19.6–45.3)
MCH RBC QN AUTO: 29.6 PG (ref 26.6–33)
MCHC RBC AUTO-ENTMCNC: 33.8 G/DL (ref 31.5–35.7)
MCV RBC AUTO: 87.5 FL (ref 79–97)
MONOCYTES # BLD AUTO: 0.25 10*3/MM3 (ref 0.1–0.9)
MONOCYTES NFR BLD AUTO: 5.2 % (ref 5–12)
NEUTROPHILS NFR BLD AUTO: 3.1 10*3/MM3 (ref 1.7–7)
NEUTROPHILS NFR BLD AUTO: 63.9 % (ref 42.7–76)
NRBC BLD AUTO-RTO: 0 /100 WBC (ref 0–0.2)
PLATELET # BLD AUTO: 219 10*3/MM3 (ref 140–450)
PMV BLD AUTO: 9.3 FL (ref 6–12)
POTASSIUM SERPL-SCNC: 4.6 MMOL/L (ref 3.5–5.2)
PROT SERPL-MCNC: 7.3 G/DL (ref 6–8.5)
QT INTERVAL: 388 MS
RBC # BLD AUTO: 4.16 10*6/MM3 (ref 3.77–5.28)
SODIUM SERPL-SCNC: 133 MMOL/L (ref 136–145)
TROPONIN T SERPL-MCNC: <0.01 NG/ML (ref 0–0.03)
WBC NRBC COR # BLD: 4.85 10*3/MM3 (ref 3.4–10.8)

## 2022-12-21 PROCEDURE — 83690 ASSAY OF LIPASE: CPT | Performed by: EMERGENCY MEDICINE

## 2022-12-21 PROCEDURE — 85025 COMPLETE CBC W/AUTO DIFF WBC: CPT | Performed by: EMERGENCY MEDICINE

## 2022-12-21 PROCEDURE — 80053 COMPREHEN METABOLIC PANEL: CPT | Performed by: EMERGENCY MEDICINE

## 2022-12-21 PROCEDURE — 84484 ASSAY OF TROPONIN QUANT: CPT | Performed by: EMERGENCY MEDICINE

## 2022-12-21 PROCEDURE — 25010000002 DIPHENHYDRAMINE PER 50 MG: Performed by: EMERGENCY MEDICINE

## 2022-12-21 PROCEDURE — 93010 ELECTROCARDIOGRAM REPORT: CPT | Performed by: INTERNAL MEDICINE

## 2022-12-21 PROCEDURE — 99284 EMERGENCY DEPT VISIT MOD MDM: CPT

## 2022-12-21 PROCEDURE — 93005 ELECTROCARDIOGRAM TRACING: CPT

## 2022-12-21 PROCEDURE — 25010000002 PROCHLORPERAZINE 10 MG/2ML SOLUTION: Performed by: EMERGENCY MEDICINE

## 2022-12-21 PROCEDURE — 96374 THER/PROPH/DIAG INJ IV PUSH: CPT

## 2022-12-21 PROCEDURE — 25010000002 ONDANSETRON PER 1 MG: Performed by: EMERGENCY MEDICINE

## 2022-12-21 PROCEDURE — 96375 TX/PRO/DX INJ NEW DRUG ADDON: CPT

## 2022-12-21 PROCEDURE — 93005 ELECTROCARDIOGRAM TRACING: CPT | Performed by: EMERGENCY MEDICINE

## 2022-12-21 RX ORDER — DIPHENHYDRAMINE HYDROCHLORIDE 50 MG/ML
12.5 INJECTION INTRAMUSCULAR; INTRAVENOUS ONCE
Status: COMPLETED | OUTPATIENT
Start: 2022-12-21 | End: 2022-12-21

## 2022-12-21 RX ORDER — SODIUM CHLORIDE 0.9 % (FLUSH) 0.9 %
10 SYRINGE (ML) INJECTION AS NEEDED
Status: DISCONTINUED | OUTPATIENT
Start: 2022-12-21 | End: 2022-12-21 | Stop reason: HOSPADM

## 2022-12-21 RX ORDER — ONDANSETRON 2 MG/ML
4 INJECTION INTRAMUSCULAR; INTRAVENOUS ONCE
Status: COMPLETED | OUTPATIENT
Start: 2022-12-21 | End: 2022-12-21

## 2022-12-21 RX ORDER — PROCHLORPERAZINE EDISYLATE 5 MG/ML
10 INJECTION INTRAMUSCULAR; INTRAVENOUS ONCE
Status: COMPLETED | OUTPATIENT
Start: 2022-12-21 | End: 2022-12-21

## 2022-12-21 RX ADMIN — ONDANSETRON 4 MG: 2 INJECTION INTRAMUSCULAR; INTRAVENOUS at 11:54

## 2022-12-21 RX ADMIN — SODIUM CHLORIDE, POTASSIUM CHLORIDE, SODIUM LACTATE AND CALCIUM CHLORIDE 1000 ML: 600; 310; 30; 20 INJECTION, SOLUTION INTRAVENOUS at 11:51

## 2022-12-21 RX ADMIN — DIPHENHYDRAMINE HYDROCHLORIDE 12.5 MG: 50 INJECTION, SOLUTION INTRAMUSCULAR; INTRAVENOUS at 15:29

## 2022-12-21 RX ADMIN — SODIUM CHLORIDE, POTASSIUM CHLORIDE, SODIUM LACTATE AND CALCIUM CHLORIDE 1000 ML: 600; 310; 30; 20 INJECTION, SOLUTION INTRAVENOUS at 14:01

## 2022-12-21 RX ADMIN — PROCHLORPERAZINE EDISYLATE 10 MG: 5 INJECTION INTRAMUSCULAR; INTRAVENOUS at 15:29

## 2022-12-21 NOTE — ED TRIAGE NOTES
Pt to ED from home via PV. Pt c/o nausea vomiting x1 day. Pt also c/o dizziness, worse with movement. Pt hx orthostatic hypotension and vertigo. Pt had fall this morning. Denies hitting head. Pt denies LOC. Pt on blood thinners.

## 2022-12-21 NOTE — ED NOTES
Pt c/o N/V/D, dizziness, and abdominal pain. Pt states the dizziness started yesterday. Pt states the N/V/D started the day before yesterday. Pt reports falling this morning due to dizziness. Pt denies hitting head or LOC. Pt takes plavix. Hx of gastroparesis, CABG, orthostatic hypotension, and vertigo.     This RN wore mask, gloves, eyewear and all other appropriate PPE while performing patient care.

## 2022-12-21 NOTE — DISCHARGE INSTRUCTIONS
Drink plenty of fluids.  Continue taking your home medications.  You may take Imodium as needed for diarrhea.  Return to the emergency department for worsening or persistent symptoms, fever, fainting, or other concern.  Follow-up with your primary care doctor and your gastroenterologist as soon as possible..

## 2022-12-21 NOTE — ED PROVIDER NOTES
EMERGENCY DEPARTMENT ENCOUNTER    Room Number:  14/14  Date of encounter:  12/22/2022  PCP: Roshan Francis MD  Historian: Patient     I used full protective equipment while examining this patient.  This includes face mask, gloves and protective eyewear.  I washed my hands before entering the room and immediately upon leaving the room.  Patient was wearing a surgical mask.      HPI:  Chief Complaint: Nausea, vomiting, diarrhea  A complete HPI/ROS/PMH/PSH/SH/FH are unobtainable due to: None    Context: Karen Bradley is a 57 y.o. female who presents to the ED by private vehicle from home c/o nausea, vomiting, and diarrhea.  Patient has had multiple episodes of nonbilious and nonbloody vomiting for the past 2 days.  She initially had several episodes of diarrhea but has not had any diarrhea today.  She also complains of right upper quadrant pain.  Pain is described as aching and burning.  Pain radiates into the lower chest.  Patient has a history of gastroparesis and orthostatic hypotension.  She complains of feeling dizzy when she stands up since yesterday.  She fell once this morning but denies any injury.  She did not hit her head.  She takes Plavix.  Denies fever, chills, shortness of breath, syncope, palpitations, headache, dysuria, or numbness/tingling/weakness in her extremities.  She has had similar symptoms multiple times in the past.  She was admitted to [facility] last month.  She has had a cholecystectomy and partial hysterectomy.      PAST MEDICAL HISTORY  Active Ambulatory Problems     Diagnosis Date Noted   • Unstable angina (HCC) 01/25/2020     Resolved Ambulatory Problems     Diagnosis Date Noted   • No Resolved Ambulatory Problems     Past Medical History:   Diagnosis Date   • Anemia    • Arthritis    • Asthma    • COPD (chronic obstructive pulmonary disease) (HCC)    • Coronary artery disease    • Depression    • Diabetes mellitus (HCC)    • Disease of thyroid gland    • GERD  (gastroesophageal reflux disease)    • History of transfusion    • Hyperlipidemia    • Hypertension    • Injury of back    • Kidney stone    • Migraine    • Myocardial infarction (HCC)    • Pneumonia          PAST SURGICAL HISTORY  Past Surgical History:   Procedure Laterality Date   • CHOLECYSTECTOMY     • CORONARY ANGIOPLASTY WITH STENT PLACEMENT     • OOPHORECTOMY Right    • VASCULAR SURGERY           FAMILY HISTORY  History reviewed. No pertinent family history.      SOCIAL HISTORY  Social History     Socioeconomic History   • Marital status: Single   Tobacco Use   • Smoking status: Never   • Smokeless tobacco: Never   Substance and Sexual Activity   • Alcohol use: Yes     Comment: occasional glass of wine   • Drug use: Never   • Sexual activity: Defer         ALLERGIES  Bee venom, Flagyl [metronidazole], Meloxicam, Metformin, and Sulfa antibiotics       REVIEW OF SYSTEMS  Review of Systems      All systems have been reviewed and are negative except as as discussed in the HPI    PHYSICAL EXAM    I have reviewed the triage vital signs and nursing notes.    ED Triage Vitals   Temp Heart Rate Resp BP SpO2   12/21/22 1051 12/21/22 1051 12/21/22 1117 12/21/22 1104 12/21/22 1051   96.6 °F (35.9 °C) 97 18 (!) 178/107 97 %      Temp src Heart Rate Source Patient Position BP Location FiO2 (%)   12/21/22 1051 -- -- -- --   Tympanic           Physical Exam  GENERAL: Awake, alert, oriented x3.  Well-developed, well-nourished and nontoxic-appearing female.  Resting comfortably in no acute distress  HENT: NCAT, nares patent, moist mucous membranes  EYES: Extraocular muscles intact, no nystagmus, PERRL  CV: regular rhythm, regular rate  RESPIRATORY: normal effort, clear to auscultation bilaterally  ABDOMEN: soft, obese, right upper quadrant tenderness without rebound or guarding, no CVA tenderness  MUSCULOSKELETAL: Extremities are nontender and without obvious deformity.  There is no calf tenderness or pedal edema  NEURO:  Speech is clear and fluent.  No aphasia.  No facial droop.  Follows commands.  Normal strength and light touch sensation all extremities.  Normal finger-nose and heel-to-shin testing bilaterally.  SKIN: warm, dry, no rash  PSYCH: Normal mood and affect      LAB RESULTS  Recent Results (from the past 24 hour(s))   ECG 12 Lead Chest Pain    Collection Time: 12/21/22 11:01 AM   Result Value Ref Range    QT Interval 388 ms   Comprehensive Metabolic Panel    Collection Time: 12/21/22 11:50 AM    Specimen: Blood   Result Value Ref Range    Glucose 331 (H) 65 - 99 mg/dL    BUN 27 (H) 6 - 20 mg/dL    Creatinine 1.14 (H) 0.57 - 1.00 mg/dL    Sodium 133 (L) 136 - 145 mmol/L    Potassium 4.6 3.5 - 5.2 mmol/L    Chloride 99 98 - 107 mmol/L    CO2 26.1 22.0 - 29.0 mmol/L    Calcium 9.3 8.6 - 10.5 mg/dL    Total Protein 7.3 6.0 - 8.5 g/dL    Albumin 4.30 3.50 - 5.20 g/dL    ALT (SGPT) 11 1 - 33 U/L    AST (SGOT) 13 1 - 32 U/L    Alkaline Phosphatase 155 (H) 39 - 117 U/L    Total Bilirubin 0.4 0.0 - 1.2 mg/dL    Globulin 3.0 gm/dL    A/G Ratio 1.4 g/dL    BUN/Creatinine Ratio 23.7 7.0 - 25.0    Anion Gap 7.9 5.0 - 15.0 mmol/L    eGFR 55.9 (L) >60.0 mL/min/1.73   Lipase    Collection Time: 12/21/22 11:50 AM    Specimen: Blood   Result Value Ref Range    Lipase 71 (H) 13 - 60 U/L   Troponin    Collection Time: 12/21/22 11:50 AM    Specimen: Blood   Result Value Ref Range    Troponin T <0.010 0.000 - 0.030 ng/mL   CBC Auto Differential    Collection Time: 12/21/22 11:50 AM    Specimen: Blood   Result Value Ref Range    WBC 4.85 3.40 - 10.80 10*3/mm3    RBC 4.16 3.77 - 5.28 10*6/mm3    Hemoglobin 12.3 12.0 - 15.9 g/dL    Hematocrit 36.4 34.0 - 46.6 %    MCV 87.5 79.0 - 97.0 fL    MCH 29.6 26.6 - 33.0 pg    MCHC 33.8 31.5 - 35.7 g/dL    RDW 12.6 12.3 - 15.4 %    RDW-SD 40.7 37.0 - 54.0 fl    MPV 9.3 6.0 - 12.0 fL    Platelets 219 140 - 450 10*3/mm3    Neutrophil % 63.9 42.7 - 76.0 %    Lymphocyte % 28.2 19.6 - 45.3 %    Monocyte % 5.2  5.0 - 12.0 %    Eosinophil % 1.9 0.3 - 6.2 %    Basophil % 0.6 0.0 - 1.5 %    Immature Grans % 0.2 0.0 - 0.5 %    Neutrophils, Absolute 3.10 1.70 - 7.00 10*3/mm3    Lymphocytes, Absolute 1.37 0.70 - 3.10 10*3/mm3    Monocytes, Absolute 0.25 0.10 - 0.90 10*3/mm3    Eosinophils, Absolute 0.09 0.00 - 0.40 10*3/mm3    Basophils, Absolute 0.03 0.00 - 0.20 10*3/mm3    Immature Grans, Absolute 0.01 0.00 - 0.05 10*3/mm3    nRBC 0.0 0.0 - 0.2 /100 WBC       Ordered the above labs and independently reviewed the results.      RADIOLOGY  No Radiology Exams Resulted Within Past 24 Hours    I ordered the above noted radiological studies. Reviewed by me and discussed with radiologist.  See dictation for official radiology interpretation.      PROCEDURES  Procedures      MEDICATIONS GIVEN IN ER    Medications   lactated ringers bolus 1,000 mL (0 mL Intravenous Stopped 12/21/22 1402)   ondansetron (ZOFRAN) injection 4 mg (4 mg Intravenous Given 12/21/22 1154)   lactated ringers bolus 1,000 mL (0 mL Intravenous Stopped 12/21/22 1730)   prochlorperazine (COMPAZINE) injection 10 mg (10 mg Intravenous Given 12/21/22 1529)   diphenhydrAMINE (BENADRYL) injection 12.5 mg (12.5 mg Intravenous Given 12/21/22 1529)         PROGRESS, DATA ANALYSIS, CONSULTS, AND MEDICAL DECISION MAKING    All labs have been independently reviewed by me.  All radiology studies have been reviewed by me and discussed with radiologist dictating the report.   EKG's independently viewed and interpreted by me.  I have reviewed the nurse's notes, vital signs, past medical history, and medication list.  Discussion below represents my analysis of pertinent findings related to patient's condition, differential diagnosis, treatment plan and final disposition.      ED Course as of 12/22/22 0807   Wed Dec 21, 2022   1133 Old records reviewed.  Patient does not have any prior admissions here.  She was admitted to [facility] in August 2022 for orthostatic syncope, acute  on chronic kidney failure, and exacerbation of gastroparesis.  Patient saw Dr. Francis, her PCP on 12/9/2022 for hospital follow-up.  She was admitted to [facility] 11/6 through 11/15/2022. [WH]   1143 Patient presents to the ED complaining of nausea, vomiting, diarrhea, right upper quadrant pain, and dizziness.  She has a history of gastroparesis and orthostatic hypotension.  She takes midodrine.  On exam, there is mild right upper quadrant tenderness but she does not have an acute abdomen.  Neuro exam is nonfocal.  Will obtain labs and EKG for further evaluation.  Will check orthostatic vitals.  Patient will be given IV fluids and IV Zofran.    Differential diagnosis includes but is not limited to: Gastroparesis exacerbation, gastroenteritis/gastritis, peptic ulcer disease, pancreatitis, colitis, diverticulitis, acute coronary syndrome, vertigo, orthostatic hypotension [WH]   1144 EKG          EKG time: 11:01 AM  Rhythm/Rate: Sinus rhythm, rate 85  P waves and OK: LAE  QRS, axis: Normal axis, anteroseptal Q waves  ST and T waves: Nonspecific ST/T wave changes in the lateral leads    Interpreted Contemporaneously by me at 11:10 AM, independently viewed  EKG is not significantly changed compared to prior EKG done on 1/25/2020   [WH]   1210 Orthostatic vitals reviewed.  Blood pressure decreased from 155/94 (lying) to 116/80 upon standing.  Heart rate increased from 76 to 86. [WH]   1225 Glucose(!): 331 [WH]   1225 BUN(!): 27 [WH]   1225 Creatinine(!): 1.14  1.13 on 12/9/2022 [WH]   1225 Sodium(!): 133 [WH]   1225 Lipase(!): 71 [WH]   1225 Troponin T: <0.010 [WH]   1402 Test results discussed with the patient.  She was able to ambulate to the bathroom with minimal assistance. [WH]   1458 Patient is feeling better but still having some nausea.  She will be given dose of IV Compazine.  Patient was advised follow-up with her PCP and gastroenterologist.  Return precautions were discussed. [WH]      ED Course User  Index  [WH] Binu Raya MD       AS OF 08:07 EST VITALS:    BP - 131/86  HR - 67  TEMP - 96.6 °F (35.9 °C) (Tympanic)  O2 SATS - 100%      DIAGNOSIS  Final diagnoses:   Nausea vomiting and diarrhea   Orthostatic dizziness   Gastroparesis   Acute upper abdominal pain         DISPOSITION  DISCHARGE    Patient discharged in stable condition.    Reviewed implications of results, diagnosis, meds, responsibility to follow up, warning signs and symptoms of possible worsening, potential complications and reasons to return to ER, including worsening or persistent symptoms, chest pain, shortness of breath, fainting, or other concern      Patient/Family voiced understanding of above instructions.    Discussed plan for discharge, as there is no emergent indication for admission. Patient referred to primary care provider for BP management due to today's BP. Pt/family is agreeable and understands need for follow up and repeat testing.  Pt is aware that discharge does not mean that nothing is wrong but it indicates no emergency is present that requires admission and they must continue care with follow-up as given below or physician of their choice.     FOLLOW-UP  Roshan Francis MD  [address] [place] [address]  [phone]    Schedule an appointment as soon as possible for a visit            Medication List      No changes were made to your prescriptions during this visit.         Dictated utilizing Dragon dictation     Binu Raya MD  12/22/22 6425